# Patient Record
Sex: FEMALE | Race: WHITE
[De-identification: names, ages, dates, MRNs, and addresses within clinical notes are randomized per-mention and may not be internally consistent; named-entity substitution may affect disease eponyms.]

---

## 2019-03-10 ENCOUNTER — HOSPITAL ENCOUNTER (EMERGENCY)
Dept: HOSPITAL 63 - ER | Age: 59
Discharge: HOME | End: 2019-03-10
Payer: COMMERCIAL

## 2019-03-10 VITALS — HEIGHT: 67 IN | BODY MASS INDEX: 19.62 KG/M2 | WEIGHT: 125 LBS

## 2019-03-10 VITALS
SYSTOLIC BLOOD PRESSURE: 113 MMHG | DIASTOLIC BLOOD PRESSURE: 79 MMHG | SYSTOLIC BLOOD PRESSURE: 113 MMHG | DIASTOLIC BLOOD PRESSURE: 79 MMHG

## 2019-03-10 DIAGNOSIS — F17.210: ICD-10-CM
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
## 2019-03-10 DIAGNOSIS — S92.355A: Primary | ICD-10-CM
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
## 2019-03-10 PROCEDURE — 99284 EMERGENCY DEPT VISIT MOD MDM: CPT

## 2019-03-10 PROCEDURE — 73630 X-RAY EXAM OF FOOT: CPT

## 2019-03-10 PROCEDURE — 29125 APPL SHORT ARM SPLINT STATIC: CPT

## 2019-03-10 PROCEDURE — 29515 APPLICATION SHORT LEG SPLINT: CPT

## 2019-03-10 NOTE — PHYS DOC
Past History


Smoking:  Cigarettes





Adult General


Chief Complaint


Chief Complaint:  FOOT INJURY PAIN





HPI


HPI





Patient is a 58 year old female who presents with complaining of left foot 

pain. Patient states she had an accidental fall yesterday and twisted her left 

fourth with edema and ecchymoses. Patient states she took ibuprofen and does 

not have any pain with rest but complains of 4/10 pain with bearing weight. 

Patient denies focal neuro deficit and other injuries.





Review of Systems


Review of Systems





Constitutional: Denies fever or chills []


Eyes: Denies change in visual acuity, redness, or eye pain []


HENT: Denies nasal congestion or sore throat []


Respiratory: Denies cough or shortness of breath []


Cardiovascular: No additional information not addressed in HPI []


GI: Denies abdominal pain, nausea, vomiting, bloody stools or diarrhea []


: Denies dysuria or hematuria []


Musculoskeletal: Denies back pain, reports joint pain []


Integument: Denies rash or skin lesions []


Neurologic: Denies headache, focal weakness or sensory changes []


Endocrine: Denies polyuria or polydipsia []





All other systems were reviewed and found to be within normal limits, except as 

documented in this note.





Physical Exam


Physical Exam





Constitutional: Well developed, well nourished, no acute distress, non-toxic 

appearance. []


HENT: Normocephalic, atraumatic


Eyes: PERRLA, EOMI, conjunctiva normal, no discharge. [] 


Neck: Normal range of motion, no tenderness, supple, no stridor. [] 


Cardiovascular:Heart rate regular rhythm, no murmur []


Lungs & Thorax:  Bilateral breath sounds clear to auscultation []


Extremities: Left foot with mild edema and ecchymosis in proximal of fourth and 

fifth metatarsal area with mild tenderness, no neurovascular deficit.


Neurologic: Alert and oriented X 3, normal motor function, normal sensory 

function, no focal deficits noted. []





EKG


EKG


[]





Radiology/Procedures


Radiology/Procedures


 [facility] [address], [place] [address] [phone]


 


 IMAGING REPORT





 Signed





PATIENT: NEERAJ BARTHOLOMEW ACCOUNT: [id number] MRN#: [id number]


: 1960 LOCATION: ER AGE: 58


SEX: F EXAM DT: 03/10/19 ACCESSION#: [id number]


STATUS: REG ER ORD. PHYSICIAN: VIVIEN PIMENTEL MD 


REASON: injury


PROCEDURE: FOOT LEFT 3V





Left foot 3 views.


 


History left foot pain, fall, injury


 


3 views were taken of the left foot. There is an oblique nondisplaced 


fracture through the mid left fifth metatarsal. No other fracture is 


noted.


 


IMPRESSION:


1. Nondisplaced fracture left fifth metatarsal.


 


Electronically signed by: Jalen Johansen MD (3/10/2019 12:31 PM) [facility]














DICTATED AND SIGNED BY:     JALEN JOHANSEN MD


DATE:     03/10/19 1231





CC: VIVIEN PIMENTEL MD; UJAN HSIEH ~





Course & Med Decision Making


Course & Med Decision Making


Pertinent Imaging studies reviewed. (See chart for details)





Evaluation of patient in ER showed 58-year-old female patient with injury to 

left foot. Patient had edema and erythema of left proximal metatarsal with 

nondisplaced fracture of fifth metatarsal in x-ray. Plan to apply short leg 

splint and instruction to follow up with on call orthopedic physician. Patient 

already has crutches. Patient doesn't want to have any pain medication for home.





Dragon Disclaimer


Dragon Disclaimer


This electronic medical record was generated, in whole or in part, using a 

voice recognition dictation system.





Departure


Departure:


Impression:  


 Primary Impression:  


 Closed nondisplaced fracture of fifth left metatarsal bone


 Additional Impressions:  


 Tobacco abuse


 Tobacco abuse counseling


Disposition:   HOME, SELF-CARE (@1300)


Condition:  IMPROVED


Referrals:  


JUAN HSIEH (PCP)


Patient Instructions:  Metatarsal Fracture, Undisplaced





Additional Instructions:  


Follow-up with Dr. Gonzalez on call orthopedic physician, call [phone] to 

make an appointment in 2 or 3 days


Use of home crutches


Take over-the-counter ibuprofen as needed for pain


Follow-up with your primary care physician in 3-5 days


Return to ER if not getting better





Problem Qualifiers








 Primary Impression:  


 Closed nondisplaced fracture of fifth left metatarsal bone


 Encounter type:  initial encounter  Qualified Codes:  S92.355A - Nondisplaced 

fracture of fifth metatarsal bone, left foot, initial encounter for closed 

fracture








VIVIEN PIMENTEL MD Mar 10, 2019 12:22

## 2019-03-10 NOTE — RAD
Left foot 3 views.

 

History left foot pain, fall, injury

 

3 views were taken of the left foot. There is an oblique nondisplaced 

fracture through the mid left fifth metatarsal. No other fracture is 

noted.

 

IMPRESSION:

1. Nondisplaced fracture left fifth metatarsal.

 

Electronically signed by: Jalen Johansen MD (3/10/2019 12:31 PM) [facility]

## 2021-09-20 ENCOUNTER — HOSPITAL ENCOUNTER (OUTPATIENT)
Dept: HOSPITAL 63 - MAMMO | Age: 61
End: 2021-09-20
Attending: PHYSICIAN ASSISTANT
Payer: COMMERCIAL

## 2021-09-20 DIAGNOSIS — Z12.31: Primary | ICD-10-CM

## 2021-09-20 PROCEDURE — 77067 SCR MAMMO BI INCL CAD: CPT

## 2021-09-20 NOTE — RAD
MG 2D BILAT SCREENING 9/20/2021 11:05 AM



INDICATION: Asymptomatic screening mammogram.



COMPARISON: 2/6/2017



TECHNIQUE: 2D CC and MLO projections were obtained of each breast.



FINDINGS:



Breast density: Category D: The breasts are extremely dense, which lowers the sensitivity of mammogra
phy.



Right breast: There are no suspicious microcalcifications, masses or areas of architectural distortio
n.



Left breast: There are no suspicious microcalcifications, masses or areas of architectural distortion
.



Bilateral mammogram is compared to prior examinations appears unchanged.



IMPRESSION:



Negative bilateral mammogram.



BI-RADS category: 1; Negative



Recommendations: Recommend annual screening mammography in one year.



Electronically signed by: Hallie Mora MD (9/20/2021 3:26 PM) [id number]